# Patient Record
Sex: MALE | URBAN - METROPOLITAN AREA
[De-identification: names, ages, dates, MRNs, and addresses within clinical notes are randomized per-mention and may not be internally consistent; named-entity substitution may affect disease eponyms.]

---

## 2018-09-20 ENCOUNTER — APPOINTMENT (RX ONLY)
Dept: URBAN - METROPOLITAN AREA CLINIC 22 | Facility: CLINIC | Age: 28
Setting detail: DERMATOLOGY
End: 2018-09-20

## 2018-09-20 DIAGNOSIS — L73.0 ACNE KELOID: ICD-10-CM

## 2018-09-20 DIAGNOSIS — L663 OTHER SPECIFIED DISEASES OF HAIR AND HAIR FOLLICLES: ICD-10-CM

## 2018-09-20 DIAGNOSIS — Z71.89 OTHER SPECIFIED COUNSELING: ICD-10-CM

## 2018-09-20 DIAGNOSIS — L85.3 XEROSIS CUTIS: ICD-10-CM

## 2018-09-20 DIAGNOSIS — Q828 OTHER SPECIFIED ANOMALIES OF SKIN: ICD-10-CM

## 2018-09-20 DIAGNOSIS — Q826 OTHER SPECIFIED ANOMALIES OF SKIN: ICD-10-CM

## 2018-09-20 DIAGNOSIS — L738 OTHER SPECIFIED DISEASES OF HAIR AND HAIR FOLLICLES: ICD-10-CM

## 2018-09-20 DIAGNOSIS — L73.9 FOLLICULAR DISORDER, UNSPECIFIED: ICD-10-CM

## 2018-09-20 DIAGNOSIS — Q819 OTHER SPECIFIED ANOMALIES OF SKIN: ICD-10-CM

## 2018-09-20 PROBLEM — L02.223 FURUNCLE OF CHEST WALL: Status: ACTIVE | Noted: 2018-09-20

## 2018-09-20 PROBLEM — Q82.8 OTHER SPECIFIED CONGENITAL MALFORMATIONS OF SKIN: Status: ACTIVE | Noted: 2018-09-20

## 2018-09-20 PROCEDURE — 99203 OFFICE O/P NEW LOW 30 MIN: CPT

## 2018-09-20 PROCEDURE — ? ADDITIONAL NOTES

## 2018-09-20 PROCEDURE — ? PRESCRIPTION

## 2018-09-20 PROCEDURE — ? COUNSELING

## 2018-09-20 RX ORDER — CLINDAMYCIN PHOSPHATE 10 MG/ML
LOTION TOPICAL
Qty: 2 | Refills: 2 | Status: ERX | COMMUNITY
Start: 2018-09-20

## 2018-09-20 RX ADMIN — CLINDAMYCIN PHOSPHATE: 10 LOTION TOPICAL at 16:52

## 2018-09-20 ASSESSMENT — LOCATION SIMPLE DESCRIPTION DERM
LOCATION SIMPLE: CHEST
LOCATION SIMPLE: LEFT UPPER BACK
LOCATION SIMPLE: POSTERIOR SCALP
LOCATION SIMPLE: RIGHT UPPER ARM
LOCATION SIMPLE: LEFT UPPER ARM
LOCATION SIMPLE: ABDOMEN

## 2018-09-20 ASSESSMENT — LOCATION ZONE DERM
LOCATION ZONE: TRUNK
LOCATION ZONE: ARM
LOCATION ZONE: SCALP

## 2018-09-20 ASSESSMENT — LOCATION DETAILED DESCRIPTION DERM
LOCATION DETAILED: MID-OCCIPITAL SCALP
LOCATION DETAILED: LEFT MID-UPPER BACK
LOCATION DETAILED: XIPHOID
LOCATION DETAILED: LEFT LATERAL INFERIOR CHEST
LOCATION DETAILED: EPIGASTRIC SKIN
LOCATION DETAILED: RIGHT DISTAL POSTERIOR UPPER ARM
LOCATION DETAILED: LEFT PROXIMAL POSTERIOR UPPER ARM

## 2018-09-20 NOTE — PROCEDURE: ADDITIONAL NOTES
Detail Level: Simple
Additional Notes: Discussed treatment options.  Condition is mild.  Will treat with the clindamycin lotion.
Additional Notes: Amylactin ultra smooth lotion.

## 2019-04-02 ENCOUNTER — HOSPITAL ENCOUNTER (EMERGENCY)
Facility: MEDICAL CENTER | Age: 29
End: 2019-04-02
Attending: EMERGENCY MEDICINE
Payer: COMMERCIAL

## 2019-04-02 VITALS
OXYGEN SATURATION: 95 % | HEART RATE: 89 BPM | SYSTOLIC BLOOD PRESSURE: 151 MMHG | RESPIRATION RATE: 18 BRPM | DIASTOLIC BLOOD PRESSURE: 78 MMHG | BODY MASS INDEX: 39.84 KG/M2 | HEIGHT: 63 IN | WEIGHT: 224.87 LBS | TEMPERATURE: 98.7 F

## 2019-04-02 DIAGNOSIS — R10.9 ABDOMINAL PAIN, UNSPECIFIED ABDOMINAL LOCATION: ICD-10-CM

## 2019-04-02 DIAGNOSIS — K21.9 GASTROESOPHAGEAL REFLUX DISEASE, ESOPHAGITIS PRESENCE NOT SPECIFIED: ICD-10-CM

## 2019-04-02 LAB — EKG IMPRESSION: NORMAL

## 2019-04-02 PROCEDURE — 93005 ELECTROCARDIOGRAM TRACING: CPT

## 2019-04-02 PROCEDURE — 99284 EMERGENCY DEPT VISIT MOD MDM: CPT

## 2019-04-02 PROCEDURE — 700102 HCHG RX REV CODE 250 W/ 637 OVERRIDE(OP): Performed by: EMERGENCY MEDICINE

## 2019-04-02 PROCEDURE — A9270 NON-COVERED ITEM OR SERVICE: HCPCS | Performed by: EMERGENCY MEDICINE

## 2019-04-02 RX ORDER — NAPROXEN SODIUM 220 MG
440 TABLET ORAL PRN
COMMUNITY

## 2019-04-02 RX ORDER — FAMOTIDINE 20 MG/1
20 TABLET, FILM COATED ORAL 2 TIMES DAILY
Qty: 60 TAB | Refills: 0 | Status: SHIPPED | OUTPATIENT
Start: 2019-04-02 | End: 2019-04-03

## 2019-04-02 RX ADMIN — LIDOCAINE HYDROCHLORIDE 30 ML: 20 SOLUTION OROPHARYNGEAL at 16:20

## 2019-04-02 ASSESSMENT — LIFESTYLE VARIABLES: DO YOU DRINK ALCOHOL: NO

## 2019-04-02 NOTE — ED PROVIDER NOTES
ED Provider Note    CHIEF COMPLAINT  Chief Complaint   Patient presents with   • Abdominal Pain     for the last 3 days    • Chest Pain     on and off for the last 3 days          HPI  Shahbaz Kumar is a 28 y.o. male who presents with abdominal discomfort that occurred 3 days ago he thought was food poisoning.  They ate steak potato and some cheese appetizers his son did seem to get sick but got better he still feels poorly.  He comes in for evaluation.  The pain is mid upper abdomen in a bandlike distribution more prominent in the epigastrium now is radiating into the left chest.  He just describes it as a grabbing feeling.  He had no nausea or vomiting but did try to induce emesis for improvement and it did not help.  He does have mild diarrhea without melena.  He has no fever no chills he is a non-smoker only occasionally drinks alcohol occasionally takes NSAIDs past medical and surgical history are negative all other systems are negative    REVIEW OF SYSTEMS  See HPI for further details    PAST MEDICAL HISTORY  History reviewed. No pertinent past medical history.    FAMILY HISTORY  History reviewed. No pertinent family history.    SOCIAL HISTORY  Social History     Social History   • Marital status: Single     Spouse name: N/A   • Number of children: N/A   • Years of education: N/A     Social History Main Topics   • Smoking status: Never Smoker   • Smokeless tobacco: Never Used   • Alcohol use Yes   • Drug use: Unknown   • Sexual activity: Not on file     Other Topics Concern   • Not on file     Social History Narrative   • No narrative on file       SURGICAL HISTORY  History reviewed. No pertinent surgical history.    CURRENT MEDICATIONS  Home Medications     Reviewed by Ramón Gaspar (Pharmacy Tech) on 04/02/19 at 1558  Med List Status: Complete   Medication Last Dose Status   Multiple Vitamin (MULTI-VITAMIN PO) > 2 days Active   naproxen (ALEVE) 220 MG tablet > 1 week Active           "      ALLERGIES  No Known Allergies    PHYSICAL EXAM  VITAL SIGNS: BP (!) 166/91   Pulse 92   Temp 36.6 °C (97.8 °F) (Temporal)   Resp 16   Ht 1.6 m (5' 3\")   Wt 102 kg (224 lb 13.9 oz)   SpO2 97%   BMI 39.83 kg/m²     Constitutional: Patient is alert and oriented x3 in no distress   HENT: Moist mucous membranes  Eyes:   No conjunctivitis or icterus  Neck: Trach is midline no palpable thyroid  Lymphatic: Negative anterior cervical lymphadenopathy  Cardiovascular: Normal heart rate    Thorax & Lungs: Clear to auscultation  Back: No CVA tenderness  Abdomen: Epigastric tenderness is noted no peritoneal findings  Neurologic: Normal motor sensation  Extremities: No edema  Psychiatric: Affect normal, Judgment normal, Mood normal.     Results for orders placed or performed during the hospital encounter of 19   EKG   Result Value Ref Range    Report       [facility] Emergency Dept[facility]    Test Date:  2019  Pt Name:    KASSANDRA BOWERS              Department[facility]  MRN:        [id number]                      [facility]  Gender:     Male                         Technician: [id number]  :        1990                   Requested By:ER TRIAGE PROTOCOL  Order #:    [id number]                    Reading MD: LANCE MARRUFO MD    Measurements  Intervals                                Axis  Rate:       99                           P:          43  OR:         124                          QRS:        -16  QRSD:       115                          T:          49  QT:         348  QTc:        447    Interpretive Statements    Normal sinus rhythm 99 with a normal corrected QT interval widened QRS  nonspecific conduction delay 0 degree axis normal ST segments no old EKG for  comparison  Electronically Signed On 2019 19:29:55 PDT by LANCE MARRUFO MD                COURSE & MEDICAL DECISION MAKING  Pertinent Labs & Imaging studies reviewed. (See chart for details)    Patient was " given a GI cocktail with relief of his symptoms.  At this point I do not think further evaluation is needed he has normal EKG is discharged with Pepcid return precautions and follow-up    FINAL IMPRESSION  1.   1. Abdominal pain, unspecified abdominal location    2. Gastroesophageal reflux disease, esophagitis presence not specified        2.   3.         Electronically signed by: Dean Kauffman, 4/2/2019 4:11 PM

## 2019-04-02 NOTE — ED TRIAGE NOTES
Pt comes in c/o abdomen and chest pain that has been on and off for the last 3 days   Thought would get better however it isn't  Slight nausea  No vomiting

## 2019-04-03 NOTE — ED NOTES
Pt cleared for d/c  dischg instructions given to pt  Verbally understands  D/c'ed to home in NAD  Rx pepcid given and pt verbally understands  To get filled ad take as prescribed  Pt to f/u w/ PCP as needed